# Patient Record
Sex: FEMALE | Race: WHITE | NOT HISPANIC OR LATINO | Employment: UNEMPLOYED | URBAN - METROPOLITAN AREA
[De-identification: names, ages, dates, MRNs, and addresses within clinical notes are randomized per-mention and may not be internally consistent; named-entity substitution may affect disease eponyms.]

---

## 2023-06-26 ENCOUNTER — TELEPHONE (OUTPATIENT)
Dept: PRIMARY CARE | Facility: CLINIC | Age: 38
End: 2023-06-26
Payer: COMMERCIAL

## 2023-06-26 NOTE — TELEPHONE ENCOUNTER
Dr. Alvarado Pt. Called saying that she has an appointment scheduled with you on Friday at 9:30 am she found out that she's 9 weeks pregnant it is still a problem to see you or should she just keep her scheduled appointment with the midwife that same day? Pt. Said call her at [phone].

## 2023-06-29 RX ORDER — SYRINGE WITH NEEDLE, 1 ML 25GX5/8"
SYRINGE, EMPTY DISPOSABLE MISCELLANEOUS
COMMUNITY
Start: 2023-05-31

## 2023-06-29 RX ORDER — PROGESTERONE 50 MG/ML
INJECTION, SOLUTION INTRAMUSCULAR
COMMUNITY
Start: 2023-05-31

## 2023-06-29 RX ORDER — PROGESTERONE 200 MG/1
CAPSULE ORAL
COMMUNITY
Start: 2023-05-30

## 2023-06-30 ENCOUNTER — OFFICE VISIT (OUTPATIENT)
Dept: PRIMARY CARE | Facility: CLINIC | Age: 38
End: 2023-06-30
Payer: COMMERCIAL

## 2023-06-30 VITALS
DIASTOLIC BLOOD PRESSURE: 70 MMHG | WEIGHT: 177 LBS | HEIGHT: 69 IN | RESPIRATION RATE: 19 BRPM | BODY MASS INDEX: 26.22 KG/M2 | TEMPERATURE: 97.5 F | SYSTOLIC BLOOD PRESSURE: 114 MMHG | HEART RATE: 88 BPM | OXYGEN SATURATION: 98 %

## 2023-06-30 DIAGNOSIS — G56.03 CARPAL TUNNEL SYNDROME ON BOTH SIDES: ICD-10-CM

## 2023-06-30 DIAGNOSIS — J30.9 ALLERGIC RHINITIS, UNSPECIFIED SEASONALITY, UNSPECIFIED TRIGGER: ICD-10-CM

## 2023-06-30 DIAGNOSIS — R53.83 OTHER FATIGUE: ICD-10-CM

## 2023-06-30 DIAGNOSIS — Z00.00 HEALTHCARE MAINTENANCE: Primary | ICD-10-CM

## 2023-06-30 PROBLEM — G56.01 CARPAL TUNNEL SYNDROME OF RIGHT WRIST: Status: RESOLVED | Noted: 2023-06-30 | Resolved: 2023-06-30

## 2023-06-30 PROBLEM — G56.01 CARPAL TUNNEL SYNDROME OF RIGHT WRIST: Status: ACTIVE | Noted: 2023-06-30

## 2023-06-30 PROCEDURE — 1036F TOBACCO NON-USER: CPT | Performed by: PEDIATRICS

## 2023-06-30 PROCEDURE — 99385 PREV VISIT NEW AGE 18-39: CPT | Performed by: PEDIATRICS

## 2023-06-30 RX ORDER — PROGESTERONE 50 MG/ML
INJECTION, SOLUTION INTRAMUSCULAR
COMMUNITY
Start: 2023-06-26

## 2023-06-30 RX ORDER — ZINC GLUCONATE 50 MG
TABLET ORAL DAILY
COMMUNITY

## 2023-06-30 RX ORDER — ACETAMINOPHEN 500 MG
5000 TABLET ORAL
COMMUNITY

## 2023-06-30 RX ORDER — ASPIRIN 81 MG/1
81 TABLET ORAL DAILY
COMMUNITY

## 2023-06-30 RX ORDER — IBUPROFEN 600 MG/1
600 TABLET ORAL EVERY 6 HOURS PRN
COMMUNITY
Start: 2021-04-25

## 2023-06-30 ASSESSMENT — PATIENT HEALTH QUESTIONNAIRE - PHQ9
2. FEELING DOWN, DEPRESSED OR HOPELESS: NOT AT ALL
SUM OF ALL RESPONSES TO PHQ9 QUESTIONS 1 AND 2: 0
1. LITTLE INTEREST OR PLEASURE IN DOING THINGS: NOT AT ALL

## 2023-06-30 ASSESSMENT — PAIN SCALES - GENERAL: PAINLEVEL: 0-NO PAIN

## 2023-06-30 NOTE — PATIENT INSTRUCTIONS
Wear splint especially at night; especially on the right hand since it is worse.  We will call with result

## 2023-06-30 NOTE — ASSESSMENT & PLAN NOTE
Wrist splint helped years ago when pregnant with first baby; feels it when driving, holding phone to ear, showering her hair, waking up in the morning;

## 2023-06-30 NOTE — PROGRESS NOTES
"Subjective   Patient ID: Ese Olea is a 38 y.o. female who presents for Annual Exam.    HPI   Patient is now pregnant 10 weeks; has had extra tired;   Sees midwife at [place].  Review of Systems    Objective   /70 (BP Location: Right arm, Patient Position: Sitting, BP Cuff Size: Adult)   Pulse 88   Temp 36.4 °C (97.5 °F) (Temporal)   Resp 19   Ht 1.74 m (5' 8.5\")   Wt 80.3 kg (177 lb)   LMP 04/24/2023   SpO2 98%   BMI 26.52 kg/m²     Physical Exam  HEENT --neg  Luns clear  Heart reg; no murmur  No thyroid nodules  No adenopathy  Tinel's negative bilaterally  No edema  Assessment/Plan   Problem List Items Addressed This Visit       Fatigue     Gets 7 to 8 hours of sleep at night; sleepy all day being pregnant; no depressed         Relevant Orders    Comprehensive Metabolic Panel    CBC    Thyroid Stimulating Hormone    Vitamin D, Total    Vitamin B12    Carpal tunnel syndrome on both sides - Primary     Wrist splint helped years ago when pregnant with first baby; feels it when driving, holding phone to ear, showering her hair, waking up in the morning;          Relevant Orders    Wrist splint    Allergic rhinitis     Other Visit Diagnoses       Healthcare maintenance        Relevant Orders    Lipid Panel               "